# Patient Record
Sex: MALE | URBAN - METROPOLITAN AREA
[De-identification: names, ages, dates, MRNs, and addresses within clinical notes are randomized per-mention and may not be internally consistent; named-entity substitution may affect disease eponyms.]

---

## 2019-11-06 DIAGNOSIS — M54.50 LOW BACK PAIN: Primary | ICD-10-CM

## 2019-12-11 ENCOUNTER — CLINICAL SUPPORT (OUTPATIENT)
Dept: REHABILITATION | Facility: OTHER | Age: 39
End: 2019-12-11
Payer: MEDICAID

## 2019-12-11 DIAGNOSIS — M54.50 CHRONIC MIDLINE LOW BACK PAIN WITHOUT SCIATICA: ICD-10-CM

## 2019-12-11 DIAGNOSIS — G89.29 CHRONIC MIDLINE LOW BACK PAIN WITHOUT SCIATICA: ICD-10-CM

## 2019-12-11 PROCEDURE — 97161 PT EVAL LOW COMPLEX 20 MIN: CPT | Mod: PN | Performed by: PHYSICAL THERAPIST

## 2019-12-11 NOTE — PLAN OF CARE
[facility]  Physical Therapy Initial Evaluation    Name: Bradly Walton  Clinic Number: [id number]    Therapy Diagnosis:   Encounter Diagnosis   Name Primary?    Chronic midline low back pain without sciatica      Physician: Fani Gray FNP    Physician Orders: PT Eval and Treat   Medical Diagnosis from Referral: M54.5 (ICD-10-CM) - Low back pain  Evaluation Date: 12/11/2019  Authorization Period Expiration: 2/21/2020  Plan of Care Expiration: 2/28/2020  Visit # / Visits authorized: 1/ 1    Time In: 3:10pm  Time Out: 3:50pm  Total Billable Time: 40 minutes    Precautions: Standard    Subjective   Date of onset: 3 years ago  History of current condition - Bradly reports: Bending down to pull a sapling from the ground and threw his back out. He was down for about 2 weeks and has had pain since. Pain is in the middle of low back and sometimes L upper buttocks. Denies any pain into extremities, numbness, tingling. Pain is only with prolonged standing greater than 10 minutes.  No pain with walking or moving. 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      Medical History:   No past medical history on file.. Reports hypotension and eye palsy     Surgical History:   Bradly Walton  has no past surgical history on file., adenoids removed     Medications:   Bradly currently has no medications in their medication list.    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 Pt reports due to visual problems     Lumbar Range of Motion:   Lumbar Spine Active ROM, measured in degrees with inclinometers at T12/L1 and S2, * Indicates pain with movement    Flexion: 80/40: 50  Extension: 40/20: 20  Left Side Bend: 25  Right Side Bend: 25    Rotation WFL B      Lower Extremity Strength  Right LE  Left LE    Knee extension: 5/5 Knee extension: 5/5   Knee flexion: 5/5 Knee flexion: 5/5   Hip flexion: 5/5 Hip flexion: 5/5   Hip extension:  4/5 Hip extension: 4+/5   Hip abduction: 5/5 Hip abduction: 5/5   Hip adduction: 5/5 Hip adduction 5/5   Ankle dorsiflexion: 5/5 Ankle dorsiflexion: 5/5   Ankle plantarflexion: 5/5 Ankle plantarflexion: 5/5         Special Tests:  -Prone Instability Test: neg  -Bridge Test: neg  -OH Squat: neg  -SLR: neg    Joint Mobility: 3-/6 mid to lower thoracic to central PA's, painful to overpressure L4-5    Palpation: TTP lumbar paraspinals     Sensation: BLE light touch grossly intact    Flexibility:    Ely's test: negative, full knee flexion no APT   SLR: 70 deg B          CMS Impairment/Limitation/Restriction for FOTO lumbar Survey    Therapist reviewed FOTO scores for Bradly Walton on 12/11/2019.   FOTO documents entered into [employer] - see Media section.    Limitation Score: 35%    Goal: 25%         TREATMENT       Home Exercises and Patient Education Provided    Education provided:   - HEP to include: side planks, bird dog, SL bridging, SL clams BTB, anti-rotations    Written Home Exercises Provided: yes.  Exercises were reviewed and Bradly was able to demonstrate them prior to the end of the session.  Bradly demonstrated good  understanding of the education provided.     See EMR under Patient  Instructions for exercises provided 12/11/2019.    Assessment   Bradly is a 38 y.o. male referred to outpatient Physical Therapy with a medical diagnosis of low back pain without sciatica. Pt presents with stiffness through thoracic spine, decreased hamstring flexibility, poor muscular endurance through trunk muscles, and weakness B hip/ trunk extensors. Negative neural tension test without c/o radicular symptoms.     Pt prognosis is Good.   Pt will benefit from skilled outpatient Physical Therapy to address the deficits stated above and in the chart below, provide pt/family education, and to maximize pt's level of independence.     Plan of care discussed with patient: Yes  Pt's spiritual, cultural and educational needs considered and patient is agreeable to the plan of care and goals as stated below:     Anticipated Barriers for therapy: none    Medical Necessity is demonstrated by the following  History  Co-morbidities and personal factors that may impact the plan of care Co-morbidities:   none    Personal Factors:   no deficits     low   Examination  Body Structures and Functions, activity limitations and participation restrictions that may impact the plan of care Body Regions:   back  trunk    Body Systems:    ROM  strength  balance    Participation Restrictions:   Standing, work    Activity limitations:   Learning and applying knowledge  no deficits    General Tasks and Commands  no deficits    Communication  no deficits    Mobility  no deficits    Self care  no deficits    Domestic Life  no deficits    Interactions/Relationships  family relationships    Life Areas  employment    Community and Social Life  community life         high   Clinical Presentation stable and uncomplicated low   Decision Making/ Complexity Score: low     Goals:  Short Term Goals: 5 weeks   1. Patient to demonstrate independence with postural awareness for improved management of condition  2. Patient to report decreased pain in low back  by 30% or greater with ADL's  3. Patient to be able to stand for 30 minutes or greater minimal to no difficulty     Long Term Goals: 10 weeks   1. Patient to be independent with home exercise program for improved self management of condition  2. Patient to have decreased subjective report of disability as noted by a score of 25% on the FOTO lumbar questionnaire   3. Patient to increased strength in BLE's to 5/5 or greater for improved performance of ADL's       Plan   Plan of care Certification: 12/11/2019 to 2/28/2020.    Outpatient Physical Therapy 1 times weekly for 10 weeks to include the following interventions: Manual Therapy, Moist Heat/ Ice, Neuromuscular Re-ed, Patient Education, Therapeutic Activites, Therapeutic Exercise and dry needling.     Petra Liu, PT

## 2019-12-31 ENCOUNTER — CLINICAL SUPPORT (OUTPATIENT)
Dept: REHABILITATION | Facility: OTHER | Age: 39
End: 2019-12-31
Payer: MEDICAID

## 2019-12-31 DIAGNOSIS — G89.29 CHRONIC MIDLINE LOW BACK PAIN WITHOUT SCIATICA: ICD-10-CM

## 2019-12-31 DIAGNOSIS — M54.50 CHRONIC MIDLINE LOW BACK PAIN WITHOUT SCIATICA: ICD-10-CM

## 2019-12-31 PROCEDURE — 97110 THERAPEUTIC EXERCISES: CPT | Mod: PN

## 2019-12-31 NOTE — PATIENT INSTRUCTIONS
Abdominal Bracing         With neutral spine, tighten pelvic floor and abdominals.     Hold for  5  Seconds. Repeat _10__ times. Do _1-2__ times a day.      Copyright © [employer]. All rights reserved.   (Home) Flexion: Pelvic Tilt        Lie with neck supported, knees bent, feet flat. Tighten and suck stomach in, pushing back down against surface. Do not push down with legs.    Hold for  5  Seconds. Repeat _10__ times. Do _1-2__ times a day.    Copyright © [employer]. All rights reserved.

## 2019-12-31 NOTE — PROGRESS NOTES
"                            Physical Therapy Daily Treatment Note     Name: Bradly Walton  Clinic Number: [id number]    Therapy Diagnosis:   Encounter Diagnosis   Name Primary?    Chronic midline low back pain without sciatica      Physician: Fani Gray FNP    Visit Date: 12/31/2019  Physician Orders: PT Eval and Treat   Medical Diagnosis from Referral: M54.5 (ICD-10-CM) - Low back pain  Evaluation Date: 12/11/2019  Authorization Period Expiration: 2/21/2020  Plan of Care Expiration: 2/28/2020  Visit # / Visits authorized: 2 (1/10)    Time In: 12:04 PM  Time Out: 12:51 PM  Total Billable Time: 47 minutes    Precautions: Standard    Subjective   Pt reports: she has been performing his HEP. States the single leg bridging is difficult with his R LE. Describes his pain as a sharp ache.    He was compliant with home exercise program.  Response to previous treatment: no adverse effects  Functional change: none reported today    Pain: 1/10  Location: back  midline    Objective     L shoulder elevated. R anterior rotated innominate. L posterior rotated innominate    Bradly received therapeutic exercises to develop strength, endurance, ROM, flexibility, posture and core stabilization for 47 minutes including:  Hamstring stretches 3 x 30"  Piriformis stretches 3 x 30"  DKTC 3 x 30"  TA bracing 10 x 5"  TA bracing with posterior pelvic tilt 10 x 5"  Gluteal sets 10 x 5"  SL bridging 10 x 10"  SL clams BTB 4 x 30"  side planks 2 x 30"  bird dog 10 x 5"  anti-rotations x 10 reps      Home Exercises Provided and Patient Education Provided     Education provided:   TA bracing  TA bracing with pelvic tilt    Written Home Exercises Provided: yes.  Exercises were reviewed and Bradly was able to demonstrate them prior to the end of the session.  Bradly demonstrated good  understanding of the education provided.     See EMR under Patient Instructions for exercises provided 12/31/2019.    Assessment   With anti-rotational " exercise, began to feel increase in pain/symptoms.    rBadly is progressing well towards his goals.   Pt prognosis is Good.     Pt will continue to benefit from skilled outpatient physical therapy to address the deficits listed in the problem list box on initial evaluation, provide pt/family education and to maximize pt's level of independence in the home and community environment.     Pt's spiritual, cultural and educational needs considered and pt agreeable to plan of care and goals.    Anticipated barriers to physical therapy: none    Goals:   Short Term Goals: 5 weeks   1. Patient to demonstrate independence with postural awareness for improved management of condition (in progress)  2. Patient to report decreased pain in low back by 30% or greater with ADL's (in progress)  3. Patient to be able to stand for 30 minutes or greater minimal to no difficulty (in progress)     Long Term Goals: 10 weeks   1. Patient to be independent with home exercise program for improved self management of condition. (in progress)  2. Patient to have decreased subjective report of disability as noted by a score of 25% on the FOTO lumbar questionnaire.  (in progress)  3. Patient to increased strength in BLE's to 5/5 or greater for improved performance of ADL's (in progress)          Plan     Continue with core strengthening and stretches. Add push/pull next visit  .  Miguel Gaona, PT

## 2020-01-08 ENCOUNTER — CLINICAL SUPPORT (OUTPATIENT)
Dept: REHABILITATION | Facility: OTHER | Age: 40
End: 2020-01-08
Payer: MEDICAID

## 2020-01-08 DIAGNOSIS — G89.29 CHRONIC MIDLINE LOW BACK PAIN WITHOUT SCIATICA: ICD-10-CM

## 2020-01-08 DIAGNOSIS — M54.50 CHRONIC MIDLINE LOW BACK PAIN WITHOUT SCIATICA: ICD-10-CM

## 2020-01-08 PROCEDURE — 97110 THERAPEUTIC EXERCISES: CPT | Mod: PN | Performed by: PHYSICAL THERAPIST

## 2020-01-08 NOTE — PROGRESS NOTES
"                            Physical Therapy Daily Treatment Note     Name: Justice Walton  Clinic Number: [id number]    Therapy Diagnosis:   Encounter Diagnosis   Name Primary?    Chronic midline low back pain without sciatica      Physician: Fani Gray FNP    Visit Date: 1/8/2020  Physician Orders: PT Eval and Treat   Medical Diagnosis from Referral: M54.5 (ICD-10-CM) - Low back pain  Evaluation Date: 12/11/2019  Authorization Period Expiration: 2/21/2020  Plan of Care Expiration: 2/28/2020  Visit # / Visits authorized: 3 (2/10)    Time In: 2:55 pm  Time Out: 3:50 pm  Total Billable Time: 55 minutes    Precautions: Standard    Subjective   Pt reports: The exercises have been about the same difficulty level and no real change since beginning PT.     He was compliant with home exercise program.  Response to previous treatment: no adverse effects  Functional change: none reported today. Continued pain with standing    Pain: 1/10  Location: back  midline    Objective      R anterior rotated innominate. R anterior/ L posterior rotated innominate    Chance received therapeutic exercises to develop strength, endurance, ROM, flexibility, posture and core stabilization for 55 minutes including:    Hamstring stretches 3 x 30"  Piriformis stretches 3 x 30"  DKTC 3 x 30"  TA bracing 10 x 5"  TA bracing with posterior pelvic tilt 10 x 5"- nt  +push pull 6" x 6  +dying bugs 2 x 1 min  Gluteal sets 10 x 5"- nt  SL bridging 10 x 10"  SL clams BTB 4 x 30"  side planks 2 x 30"  bird dog 10 x 5"  anti-rotations x 15 reps BTB  +Rows free motion #13 x 20  +chops in 1/2 lunge #13 x 20    Home Exercises Provided and Patient Education Provided     Education provided:   Added rows, chops, and dying bugs 3 times per week    Written Home Exercises Provided: yes.  Exercises were reviewed and Chance was able to demonstrate them prior to the end of the session.  Chance demonstrated good  understanding of the education provided.     See " EMR under Patient Instructions for exercises provided 12/31/2019.    Assessment   Tolerated treatment well without exacerbation of symptoms. Frequent cuing for neutral lumbar spine and maintaining a good transverse abdominus activation with progression of core strengthening exercises.     Chance is progressing well towards his goals.   Pt prognosis is Good.     Pt will continue to benefit from skilled outpatient physical therapy to address the deficits listed in the problem list box on initial evaluation, provide pt/family education and to maximize pt's level of independence in the home and community environment.     Pt's spiritual, cultural and educational needs considered and pt agreeable to plan of care and goals.    Anticipated barriers to physical therapy: none    Goals:   Short Term Goals: 5 weeks   1. Patient to demonstrate independence with postural awareness for improved management of condition (in progress)  2. Patient to report decreased pain in low back by 30% or greater with ADL's (in progress)  3. Patient to be able to stand for 30 minutes or greater minimal to no difficulty (in progress)     Long Term Goals: 10 weeks   1. Patient to be independent with home exercise program for improved self management of condition. (in progress)  2. Patient to have decreased subjective report of disability as noted by a score of 25% on the FOTO lumbar questionnaire.  (in progress)  3. Patient to increased strength in BLE's to 5/5 or greater for improved performance of ADL's (in progress)          Plan     Continue with core strengthening and stretches. Add push/pull next visit  .  Petra Liu, PT